# Patient Record
Sex: FEMALE | Race: WHITE | NOT HISPANIC OR LATINO | Employment: OTHER | URBAN - METROPOLITAN AREA
[De-identification: names, ages, dates, MRNs, and addresses within clinical notes are randomized per-mention and may not be internally consistent; named-entity substitution may affect disease eponyms.]

---

## 2017-05-30 ENCOUNTER — ANESTHESIA EVENT (OUTPATIENT)
Dept: PAIN MEDICINE | Facility: HOSPITAL | Age: 72
End: 2017-05-30

## 2017-05-30 ENCOUNTER — HOSPITAL ENCOUNTER (OUTPATIENT)
Dept: PAIN MEDICINE | Facility: HOSPITAL | Age: 72
Discharge: HOME | End: 2017-05-30
Admitting: ORTHOPAEDIC SURGERY

## 2017-05-30 ENCOUNTER — ANESTHESIA (OUTPATIENT)
Dept: PAIN MEDICINE | Facility: HOSPITAL | Age: 72
End: 2017-05-30

## 2017-05-30 ENCOUNTER — HOSPITAL ENCOUNTER (OUTPATIENT)
Dept: GENERAL RADIOLOGY | Facility: HOSPITAL | Age: 72
Discharge: HOME | End: 2017-05-30

## 2017-05-30 VITALS
DIASTOLIC BLOOD PRESSURE: 82 MMHG | HEIGHT: 64 IN | WEIGHT: 130 LBS | OXYGEN SATURATION: 95 % | TEMPERATURE: 98.8 F | BODY MASS INDEX: 22.2 KG/M2 | RESPIRATION RATE: 16 BRPM | HEART RATE: 62 BPM | SYSTOLIC BLOOD PRESSURE: 128 MMHG

## 2017-05-30 DIAGNOSIS — R52 PAIN: ICD-10-CM

## 2017-05-30 LAB — GLUCOSE BLDC GLUCOMTR-MCNC: 111 MG/DL (ref 70–130)

## 2017-05-30 PROCEDURE — C1755 CATHETER, INTRASPINAL: HCPCS

## 2017-05-30 PROCEDURE — 25010000002 METHYLPREDNISOLONE PER 80 MG: Performed by: ANESTHESIOLOGY

## 2017-05-30 PROCEDURE — 77003 FLUOROGUIDE FOR SPINE INJECT: CPT

## 2017-05-30 PROCEDURE — 82962 GLUCOSE BLOOD TEST: CPT

## 2017-05-30 PROCEDURE — 0 IOPAMIDOL 41 % SOLUTION: Performed by: ANESTHESIOLOGY

## 2017-05-30 RX ORDER — SODIUM CHLORIDE 0.9 % (FLUSH) 0.9 %
1-10 SYRINGE (ML) INJECTION AS NEEDED
Status: DISCONTINUED | OUTPATIENT
Start: 2017-05-30 | End: 2017-05-31 | Stop reason: HOSPADM

## 2017-05-30 RX ORDER — CELECOXIB 100 MG/1
100 CAPSULE ORAL 2 TIMES DAILY PRN
COMMUNITY

## 2017-05-30 RX ORDER — MIDAZOLAM HYDROCHLORIDE 1 MG/ML
1 INJECTION INTRAMUSCULAR; INTRAVENOUS
Status: DISCONTINUED | OUTPATIENT
Start: 2017-05-30 | End: 2017-05-31 | Stop reason: HOSPADM

## 2017-05-30 RX ORDER — FENTANYL CITRATE 50 UG/ML
50 INJECTION, SOLUTION INTRAMUSCULAR; INTRAVENOUS
Status: DISCONTINUED | OUTPATIENT
Start: 2017-05-30 | End: 2017-05-31 | Stop reason: HOSPADM

## 2017-05-30 RX ORDER — METHYLPREDNISOLONE ACETATE 80 MG/ML
80 INJECTION, SUSPENSION INTRA-ARTICULAR; INTRALESIONAL; INTRAMUSCULAR; SOFT TISSUE ONCE
Status: COMPLETED | OUTPATIENT
Start: 2017-05-30 | End: 2017-05-30

## 2017-05-30 RX ORDER — LIDOCAINE HYDROCHLORIDE 10 MG/ML
1 INJECTION, SOLUTION INFILTRATION; PERINEURAL ONCE
Status: DISCONTINUED | OUTPATIENT
Start: 2017-05-30 | End: 2017-05-31 | Stop reason: HOSPADM

## 2017-05-30 RX ADMIN — IOPAMIDOL 10 ML: 408 INJECTION, SOLUTION INTRATHECAL at 10:43

## 2017-05-30 RX ADMIN — METHYLPREDNISOLONE ACETATE 80 MG: 80 INJECTION, SUSPENSION INTRA-ARTICULAR; INTRALESIONAL; INTRAMUSCULAR; SOFT TISSUE at 10:43

## 2017-11-14 ENCOUNTER — TRANSCRIBE ORDERS (OUTPATIENT)
Dept: ADMINISTRATIVE | Facility: HOSPITAL | Age: 72
End: 2017-11-14

## 2017-11-14 DIAGNOSIS — Z12.39 SCREENING BREAST EXAMINATION: Primary | ICD-10-CM

## 2018-01-03 ENCOUNTER — APPOINTMENT (OUTPATIENT)
Dept: MAMMOGRAPHY | Facility: HOSPITAL | Age: 73
End: 2018-01-03
Attending: INTERNAL MEDICINE

## 2018-01-05 ENCOUNTER — HOSPITAL ENCOUNTER (OUTPATIENT)
Dept: MAMMOGRAPHY | Facility: HOSPITAL | Age: 73
Discharge: HOME | End: 2018-01-05
Attending: INTERNAL MEDICINE | Admitting: INTERNAL MEDICINE

## 2018-01-05 DIAGNOSIS — Z12.39 SCREENING BREAST EXAMINATION: ICD-10-CM

## 2018-01-05 PROCEDURE — 77063 BREAST TOMOSYNTHESIS BI: CPT

## 2018-01-05 PROCEDURE — 77067 SCR MAMMO BI INCL CAD: CPT

## 2018-12-03 ENCOUNTER — TRANSCRIBE ORDERS (OUTPATIENT)
Dept: ADMINISTRATIVE | Facility: HOSPITAL | Age: 73
End: 2018-12-03

## 2018-12-03 DIAGNOSIS — Z12.39 SCREENING BREAST EXAMINATION: Primary | ICD-10-CM

## 2019-03-21 ENCOUNTER — TRANSCRIBE ORDERS (OUTPATIENT)
Dept: ADMINISTRATIVE | Facility: HOSPITAL | Age: 74
End: 2019-03-21

## 2019-03-21 DIAGNOSIS — Z78.0 POST-MENOPAUSAL: ICD-10-CM

## 2019-03-21 DIAGNOSIS — Z12.39 BREAST CANCER SCREENING: Primary | ICD-10-CM

## 2019-04-16 ENCOUNTER — HOSPITAL ENCOUNTER (OUTPATIENT)
Dept: BONE DENSITY | Facility: HOSPITAL | Age: 74
Discharge: HOME | End: 2019-04-16
Admitting: INTERNAL MEDICINE

## 2019-04-16 ENCOUNTER — HOSPITAL ENCOUNTER (OUTPATIENT)
Dept: MAMMOGRAPHY | Facility: HOSPITAL | Age: 74
Discharge: HOME | End: 2019-04-16

## 2019-04-16 DIAGNOSIS — Z12.39 BREAST CANCER SCREENING: ICD-10-CM

## 2019-04-16 DIAGNOSIS — Z78.0 POST-MENOPAUSAL: ICD-10-CM

## 2019-04-16 PROCEDURE — 77080 DXA BONE DENSITY AXIAL: CPT

## 2019-04-16 PROCEDURE — 77067 SCR MAMMO BI INCL CAD: CPT

## 2019-04-16 PROCEDURE — 77063 BREAST TOMOSYNTHESIS BI: CPT

## 2020-05-12 ENCOUNTER — TRANSCRIBE ORDERS (OUTPATIENT)
Dept: ADMINISTRATIVE | Facility: HOSPITAL | Age: 75
End: 2020-05-12

## 2020-05-12 DIAGNOSIS — Z12.31 VISIT FOR SCREENING MAMMOGRAM: Primary | ICD-10-CM

## 2020-07-07 ENCOUNTER — HOSPITAL ENCOUNTER (OUTPATIENT)
Dept: MAMMOGRAPHY | Facility: HOSPITAL | Age: 75
Discharge: HOME | End: 2020-07-07
Admitting: INTERNAL MEDICINE

## 2020-07-07 DIAGNOSIS — Z12.31 VISIT FOR SCREENING MAMMOGRAM: ICD-10-CM

## 2020-07-07 PROCEDURE — 77063 BREAST TOMOSYNTHESIS BI: CPT

## 2020-07-07 PROCEDURE — 77067 SCR MAMMO BI INCL CAD: CPT

## 2021-03-09 DIAGNOSIS — Z23 IMMUNIZATION DUE: ICD-10-CM

## 2021-08-26 ENCOUNTER — TRANSCRIBE ORDERS (OUTPATIENT)
Dept: ADMINISTRATIVE | Facility: HOSPITAL | Age: 76
End: 2021-08-26

## 2021-08-26 DIAGNOSIS — Z12.31 VISIT FOR SCREENING MAMMOGRAM: Primary | ICD-10-CM

## 2021-12-06 ENCOUNTER — HOSPITAL ENCOUNTER (OUTPATIENT)
Dept: MAMMOGRAPHY | Facility: HOSPITAL | Age: 76
Discharge: HOME | End: 2021-12-06
Admitting: INTERNAL MEDICINE

## 2021-12-06 DIAGNOSIS — Z12.31 VISIT FOR SCREENING MAMMOGRAM: ICD-10-CM

## 2021-12-06 PROCEDURE — 77063 BREAST TOMOSYNTHESIS BI: CPT

## 2021-12-06 PROCEDURE — 77067 SCR MAMMO BI INCL CAD: CPT

## 2022-10-24 ENCOUNTER — TRANSCRIBE ORDERS (OUTPATIENT)
Dept: ADMINISTRATIVE | Facility: HOSPITAL | Age: 77
End: 2022-10-24

## 2022-10-24 DIAGNOSIS — Z12.31 SCREENING MAMMOGRAM FOR BREAST CANCER: Primary | ICD-10-CM

## 2022-12-28 ENCOUNTER — HOSPITAL ENCOUNTER (OUTPATIENT)
Dept: MAMMOGRAPHY | Facility: HOSPITAL | Age: 77
Discharge: HOME | End: 2022-12-28
Admitting: INTERNAL MEDICINE

## 2022-12-28 DIAGNOSIS — Z12.31 SCREENING MAMMOGRAM FOR BREAST CANCER: ICD-10-CM

## 2022-12-28 PROCEDURE — 77063 BREAST TOMOSYNTHESIS BI: CPT

## 2022-12-28 PROCEDURE — 77067 SCR MAMMO BI INCL CAD: CPT

## 2023-11-30 ENCOUNTER — TRANSCRIBE ORDERS (OUTPATIENT)
Dept: ADMINISTRATIVE | Facility: HOSPITAL | Age: 78
End: 2023-11-30
Payer: MEDICARE

## 2023-11-30 DIAGNOSIS — Z12.31 ENCOUNTER FOR SCREENING MAMMOGRAM FOR BREAST CANCER: Primary | ICD-10-CM

## 2024-01-23 ENCOUNTER — HOSPITAL ENCOUNTER (OUTPATIENT)
Dept: MAMMOGRAPHY | Facility: HOSPITAL | Age: 79
Discharge: HOME | End: 2024-01-23
Admitting: INTERNAL MEDICINE
Payer: MEDICARE

## 2024-01-23 DIAGNOSIS — Z12.31 ENCOUNTER FOR SCREENING MAMMOGRAM FOR BREAST CANCER: ICD-10-CM

## 2024-01-23 PROCEDURE — 77063 BREAST TOMOSYNTHESIS BI: CPT

## 2024-01-23 PROCEDURE — 77067 SCR MAMMO BI INCL CAD: CPT

## 2024-05-14 ENCOUNTER — TRANSCRIBE ORDERS (OUTPATIENT)
Dept: ADMINISTRATIVE | Facility: HOSPITAL | Age: 79
End: 2024-05-14
Payer: MEDICARE

## 2024-05-14 DIAGNOSIS — R13.10 DYSPHAGIA, UNSPECIFIED TYPE: Primary | ICD-10-CM

## 2024-05-28 ENCOUNTER — HOSPITAL ENCOUNTER (OUTPATIENT)
Dept: GENERAL RADIOLOGY | Facility: HOSPITAL | Age: 79
Discharge: HOME | End: 2024-05-28
Admitting: INTERNAL MEDICINE
Payer: MEDICARE

## 2024-05-28 DIAGNOSIS — R13.10 DYSPHAGIA, UNSPECIFIED TYPE: ICD-10-CM

## 2024-05-28 PROCEDURE — A9270 NON-COVERED ITEM OR SERVICE: HCPCS | Performed by: INTERNAL MEDICINE

## 2024-05-28 PROCEDURE — 92611 MOTION FLUOROSCOPY/SWALLOW: CPT | Performed by: SPEECH-LANGUAGE PATHOLOGIST

## 2024-05-28 PROCEDURE — 74230 X-RAY XM SWLNG FUNCJ C+: CPT

## 2024-05-28 PROCEDURE — 63710000001 BARIUM SULFATE 40 % RECONSTITUTED SUSPENSION: Performed by: INTERNAL MEDICINE

## 2024-05-28 PROCEDURE — 63710000001 BARIUM SULFATE 60 % CREAM: Performed by: INTERNAL MEDICINE

## 2024-05-28 PROCEDURE — 63710000001 BARIUM SULFATE 98 % RECONSTITUTED SUSPENSION: Performed by: INTERNAL MEDICINE

## 2024-05-28 RX ADMIN — BARIUM SULFATE 55 ML: 0.81 POWDER, FOR SUSPENSION ORAL at 10:13

## 2024-05-28 RX ADMIN — BARIUM SULFATE 4 ML: 980 POWDER, FOR SUSPENSION ORAL at 10:13

## 2024-05-28 RX ADMIN — BARIUM SULFATE 1 TEASPOON(S): 0.6 CREAM ORAL at 10:13

## 2024-05-28 NOTE — MBS/VFSS/FEES
Outpatient Speech Language Pathology   Adult Swallow Initial Evaluation-VFSS  [facility]     Patient Name: Rain Street  : 1945  MRN: [id number]  Today's Date: 2024         Visit Date: 2024   There is no problem list on file for this patient.       Past Medical History:   Diagnosis Date    Acid reflux     Diabetes     HTN (hypertension)     Low back pain         Past Surgical History:   Procedure Laterality Date    AUGMENTATION MAMMAPLASTY      BREAST BIOPSY      BUNIONECTOMY       SECTION      FOOT SURGERY Right     TUBAL ABDOMINAL LIGATION           Visit Dx:     ICD-10-CM ICD-9-CM   1. Dysphagia, unspecified type  R13.10 787.20            OP SLP Assessment/Plan - 24 1338          SLP Assessment    Functional Problems Swallowing  -SA    Impact on Function: Swallowing Risk of dehydration;Risk of aspiration  -SA    Clinical Impression: Swallowing Minimal:  -SA    Patient/caregiver participated in establishment of treatment plan and goals Yes  -SA    Patient would benefit from skilled therapy intervention No  -SA       SLP Plan    Frequency eval only  -SA              User Key  (r) = Recorded By, (t) = Taken By, (c) = Cosigned By      Initials Name Provider Type    SA Isi Regalado, SLP Speech and Language Pathologist                     SLP Adult Swallow Evaluation       Row Name 24 1015       Rehab Evaluation    Document Type evaluation;other (see comments)  vfss  -SA    Subjective Information complains of  food sticking, choking (vinegar foods)  -SA    Patient Observations alert;cooperative  -SA    Patient Effort good  -SA    Symptoms Noted During/After Treatment none  -SA       General Information    Patient Profile Reviewed yes  -SA    Current Method of Nutrition regular textures;thin liquids  -SA    Precautions/Limitations, Vision WFL;for purposes of eval  -SA    Precautions/Limitations, Hearing WFL;for purposes of eval  -SA    Prior Level of  Function-Communication WFL  -SA    Prior Level of Function-Swallowing no diet consistency restrictions  -SA    Plans/Goals Discussed with patient;agreed upon  -    Barriers to Rehab none identified  -SA    Patient's Goals for Discharge eat/drink without coughing/choking  -SA       Pain    Additional Documentation Pain Scale: Numbers Pre/Post-Treatment (Group)  -SA       Pain Scale: Numbers Pre/Post-Treatment    Pretreatment Pain Rating 0/10 - no pain  -SA    Posttreatment Pain Rating 0/10 - no pain  -SA       MBS/VFSS    Utensils Used spoon;cup;straw  -SA    Consistencies Trialed regular textures;soft to chew textures;mixed consistency;pureed;thin liquids  -SA       MBS/VFSS Interpretation    Oral Prep Phase WFL  -SA    Oral Transit Phase WFL  -SA    Oral Residue WFL  -SA       Initiation of Pharyngeal Swallow    Pharyngeal Phase functional pharyngeal phase of swallowing  -SA    Anatomical abnormalities noted prominent cricopharyngeous/ cricopharyngeal bar  -SA    Anatomical abnormalities functional impact no functional impact on swallowing  -SA       SLP Communication to Radiology    Summary Statement VFSS completed in conjunction with Dr Molina.  Patient demonstrates a functional oropharyngeal swallow.  No aspiration observed with any consistency.  Promininent cricopharyngeus observed, but did not impact safety of the swallow.  -SA       SLP Evaluation Clinical Impression    SLP Swallowing Diagnosis functional oral phase;functional pharyngeal phase  -SA    Functional Impact no impact on function  -SA    Swallow Criteria for Skilled Therapeutic Interventions Met no problems identified which require skilled intervention  -SA       Recommendations    Therapy Frequency (Swallow) PRN  -SA    Predicted Duration Therapy Intervention (Days) until discharge  -SA    SLP Diet Recommendation regular textures;thin liquids  -SA    Recommended Precautions and Strategies upright posture during/after eating;small bites of food  and sips of liquid  -SA    Oral Care Recommendations Oral Care BID/PRN  -SA    SLP Rec. for Method of Medication Administration as tolerated  -SA              User Key  (r) = Recorded By, (t) = Taken By, (c) = Cosigned By      Initials Name Provider Type    Isi Lew SLP Speech and Language Pathologist                                   OP SLP Education       Row Name 05/28/24 1339       Education    Barriers to Learning No barriers identified  -SA    Education Provided Described results of evaluation;Patient expressed understanding of evaluation  -SA    Assessed Learning needs;Learning motivation  -    Learning Motivation Strong  -    Learning Method Explanation  -    Teaching Response Verbalized understanding  -              User Key  (r) = Recorded By, (t) = Taken By, (c) = Cosigned By      Initials Name Effective Dates    Isi Lew SLP 01/11/24 -                                Time Calculation:   SLP Start Time: 1015    Therapy Charges for Today       Code Description Service Date Service Provider Modifiers Qty    [id number] HC ST MOTION FLUORO EVAL SWALLOW 5 5/28/2024 Isi Regalado SLP GN 1                     TRINI Moon  5/28/2024

## 2024-12-23 ENCOUNTER — TRANSCRIBE ORDERS (OUTPATIENT)
Dept: ADMINISTRATIVE | Facility: HOSPITAL | Age: 79
End: 2024-12-23
Payer: MEDICARE

## 2024-12-23 DIAGNOSIS — Z12.31 BREAST CANCER SCREENING BY MAMMOGRAM: Primary | ICD-10-CM

## 2025-02-17 ENCOUNTER — HOSPITAL ENCOUNTER (OUTPATIENT)
Dept: MAMMOGRAPHY | Facility: HOSPITAL | Age: 80
Discharge: HOME | End: 2025-02-17
Admitting: INTERNAL MEDICINE
Payer: MEDICARE

## 2025-02-17 DIAGNOSIS — Z12.31 BREAST CANCER SCREENING BY MAMMOGRAM: ICD-10-CM

## 2025-02-17 PROCEDURE — 77063 BREAST TOMOSYNTHESIS BI: CPT

## 2025-02-17 PROCEDURE — 77067 SCR MAMMO BI INCL CAD: CPT
